# Patient Record
Sex: FEMALE
[De-identification: names, ages, dates, MRNs, and addresses within clinical notes are randomized per-mention and may not be internally consistent; named-entity substitution may affect disease eponyms.]

---

## 2022-05-21 ENCOUNTER — NURSE TRIAGE (OUTPATIENT)
Dept: OTHER | Facility: CLINIC | Age: 22
End: 2022-05-21
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
## 2024-10-26 NOTE — PROGRESS NOTES
Vandana Vilchis a 24 y.o. female  has no past medical history on file. presents to office today for eye concerns.    Subjective:    Proptosis, Left   - patient notes that her left eye has been bulging since 3/2024. Patient notes that her TSH was low and her T3 and T4 was normal. Patient notes that she does not have eye pain. She notes that she has pressure upon waking, around 1 month. No eye drainage. New occurrence. She wears glasses and contact, last eye exam was this past summer, notes that her eye was dilated and directed to follow up with PCP. Denies vision changes. No family hx of thyroid disorder.   - tsh 0.398, t4 1.22, t3 2.9  - patient notes that she feels fatigued all the time; notes that she has intermittent heart palpitations (at least one time per week), no diarrhea, denies heat intolerance     Tinea Corporis   - Onset: 1 year ago  - Location: B/L feet, behind right ear and left ear  - Duration: comes and goes for ears; constant feet  - Character: itchy, flaky, thickened skin   - Aggravating factors: itching is worse when wearing socks and shoes all day  - Alleviating factors: moisturizing cream   - Radiation: no     Cervical Cancer Screening  - pap completed 4/2024, normal   - IUD placement 3/2024       History reviewed. No pertinent past medical history.  Past Surgical History:   Procedure Laterality Date    TONSILLECTOMY       Social History     Socioeconomic History    Marital status: Single     Spouse name: None    Number of children: None    Years of education: None    Highest education level: None   Tobacco Use    Smoking status: Never    Smokeless tobacco: Never   Vaping Use    Vaping status: Never Used   Substance and Sexual Activity    Drug use: Never    Sexual activity: Yes     Family History   Problem Relation Age of Onset    Hypertension Mother     No Known Problems Father      Current Outpatient Medications   Medication Sig Dispense Refill    nystatin-triamcinolone (MYCOLOG)

## 2024-10-28 ENCOUNTER — OFFICE VISIT (OUTPATIENT)
Facility: CLINIC | Age: 24
End: 2024-10-28
Payer: COMMERCIAL

## 2024-10-28 VITALS
HEART RATE: 84 BPM | BODY MASS INDEX: 25.06 KG/M2 | TEMPERATURE: 98.3 F | DIASTOLIC BLOOD PRESSURE: 69 MMHG | OXYGEN SATURATION: 99 % | SYSTOLIC BLOOD PRESSURE: 106 MMHG | WEIGHT: 150.4 LBS | HEIGHT: 65 IN

## 2024-10-28 DIAGNOSIS — H05.20 PROPTOSIS: Primary | ICD-10-CM

## 2024-10-28 DIAGNOSIS — E05.90 SUBCLINICAL HYPERTHYROIDISM: ICD-10-CM

## 2024-10-28 DIAGNOSIS — B35.4 TINEA CORPORIS: ICD-10-CM

## 2024-10-28 PROCEDURE — 99204 OFFICE O/P NEW MOD 45 MIN: CPT | Performed by: STUDENT IN AN ORGANIZED HEALTH CARE EDUCATION/TRAINING PROGRAM

## 2024-10-28 RX ORDER — NYSTATIN AND TRIAMCINOLONE ACETONIDE 100000; 1 [USP'U]/G; MG/G
OINTMENT TOPICAL
Qty: 30 G | Refills: 2 | Status: SHIPPED | OUTPATIENT
Start: 2024-10-28

## 2024-10-28 ASSESSMENT — PATIENT HEALTH QUESTIONNAIRE - PHQ9
1. LITTLE INTEREST OR PLEASURE IN DOING THINGS: NOT AT ALL
SUM OF ALL RESPONSES TO PHQ QUESTIONS 1-9: 0
SUM OF ALL RESPONSES TO PHQ QUESTIONS 1-9: 0
2. FEELING DOWN, DEPRESSED OR HOPELESS: NOT AT ALL
SUM OF ALL RESPONSES TO PHQ QUESTIONS 1-9: 0
SUM OF ALL RESPONSES TO PHQ9 QUESTIONS 1 & 2: 0
SUM OF ALL RESPONSES TO PHQ QUESTIONS 1-9: 0

## 2024-10-28 NOTE — ASSESSMENT & PLAN NOTE
Patient noted to have unilateral proptosis of the left eye.  Her symptoms began around March/2024, since that time she has noticed increased pressure behind her left eye.  Most recent thyroid panel positive for subclinical hyperthyroidism.  No prior history of thyroid dysfunction, she currently does not take any thyroid medication.  Patient does wear glasses and contacts.  Given unilateral presentation, lower suspicion for Graves' disease.  Concerns for cavernous sinus thrombosis vs carotid cavernous fistula vs orbital cellulitis vs mucormycosis vs orbital fracture vs orbital hematoma vs orbital tumor. Patient afebrile.  PERRL, EOMI, Normal H-test, no nystagmus, no diplopia. Will refer to ophthalmology for further investigation, stat. Will obtain CT of orbits w and w/o.

## 2024-10-28 NOTE — ASSESSMENT & PLAN NOTE
Patient to have pruritic, erythematous skin rash located on the bilateral soles of both feet and behind her earlobes, associated with mild dry skin flaking.  Given history and physical exam findings, I suspect her symptoms are related to tinea corporis, will start nystatin/triamcinolone ointment.  Patient directed to apply ointment until resolution of symptoms.  In the event her symptoms do not resolve, I directed her to contact the office.

## 2024-10-28 NOTE — PROGRESS NOTES
Vandana Vilchis is a 24 y.o. female     Chief Complaint   Patient presents with    New Patient    Thyroid Problem     Eyes bulging       \"Have you been to the ER, urgent care clinic since your last visit?  Hospitalized since your last visit?\"    NO    “Have you seen or consulted any other health care providers outside of [facility] since your last visit?”    NO        “Have you had a pap smear?”    Dr. Ortiz with [facility]; April 2024    No cervical cancer screening on file

## 2024-10-28 NOTE — ASSESSMENT & PLAN NOTE
Most recent thyroid cascade; tsh 0.398, t4 1.22, t3 2.9.  Per patient she denies hyperthyroid symptoms.

## 2024-10-29 ENCOUNTER — HOSPITAL ENCOUNTER (OUTPATIENT)
Facility: HOSPITAL | Age: 24
Discharge: HOME | End: 2024-11-01
Attending: STUDENT IN AN ORGANIZED HEALTH CARE EDUCATION/TRAINING PROGRAM
Payer: COMMERCIAL

## 2024-10-29 DIAGNOSIS — H05.20 PROPTOSIS: ICD-10-CM

## 2024-10-29 PROCEDURE — 70481 CT ORBIT/EAR/FOSSA W/DYE: CPT

## 2024-10-29 PROCEDURE — 6360000004 HC RX CONTRAST MEDICATION: Performed by: STUDENT IN AN ORGANIZED HEALTH CARE EDUCATION/TRAINING PROGRAM

## 2024-10-29 RX ORDER — IOPAMIDOL 755 MG/ML
100 INJECTION, SOLUTION INTRAVASCULAR
Status: COMPLETED | OUTPATIENT
Start: 2024-10-29 | End: 2024-10-29

## 2024-10-29 RX ADMIN — IOPAMIDOL 100 ML: 755 INJECTION, SOLUTION INTRAVENOUS at 15:43

## 2024-10-30 NOTE — RESULT ENCOUNTER NOTE
Attempted to contact patient to review CT orbits w/ contrast, no answer, left non-urgent VM.   Concerns for thyroid orbitopathy (L>R). Stat referral to ophthalmology for evaluation was placed during most recent office visit, 10/28/24. Will discuss need for endo referral for further management.